# Patient Record
Sex: FEMALE | Race: WHITE | URBAN - METROPOLITAN AREA
[De-identification: names, ages, dates, MRNs, and addresses within clinical notes are randomized per-mention and may not be internally consistent; named-entity substitution may affect disease eponyms.]

---

## 2020-08-25 ENCOUNTER — TELEPHONE (OUTPATIENT)
Dept: SURGERY | Age: 45
End: 2020-08-25

## 2020-08-25 NOTE — TELEPHONE ENCOUNTER
Tell her GBUS showed sludge. Recommendation with this ultrasound and her symptoms is lap clare. If she wants to proceed, put [facility] scheduling sheet in folder for me to fill out. If she wants to discuss further, then follow up at [facility] clinic.

## 2020-09-10 LAB
ADENOVIRUS: NOT DETECTED
ALBUMIN SERPL-MCNC: 4 G/DL (ref 3.6–5)
ALP BLD-CCNC: 81 U/L (ref 46–116)
ALT SERPL-CCNC: 26 U/L (ref 12–78)
AST SERPL-CCNC: 24 U/L (ref 12–36)
BILIRUB SERPL-MCNC: 0.4 MG/DL (ref 0–1.1)
BILIRUBIN DIRECT: 0.1 MG/DL (ref 0–0.3)
BILIRUBIN, INDIRECT: 0.3 MG/DL (ref 0–0.9)
BORDETELLA PARAPERTUSSIS: NOT DETECTED
BORDETELLA PERTUSSIS: NOT DETECTED
CHLAMYDOPHILA PNEUMONIA PCR: NOT DETECTED
CORONAVIRUS 229E: NOT DETECTED
CORONAVIRUS HKU1: NOT DETECTED
CORONAVIRUS NL63: NOT DETECTED
CORONAVIRUS OC43: NOT DETECTED
HUMAN METAPNEUMOVIRUS: NOT DETECTED
HUMAN RHINOVIRUS/ENTEROVIRUS: DETECTED
INFLUENZA A (NO SUBTYPE) BY PCR: NOT DETECTED
INFLUENZA A H1-2009: NOT DETECTED
INFLUENZA A H3: NOT DETECTED
INFLUENZA A: NOT DETECTED
INFLUENZA B: NOT DETECTED
MYCOPLASMA PNEUMONIAE: NOT DETECTED
PARAINFLUENZA 2: NOT DETECTED
PARAINFLUENZA 3: NOT DETECTED
PARAINFLUENZA 4: NOT DETECTED
PARAINFLUENZA1: NOT DETECTED
RESPIRATORY SYNCYTIAL VIRUS: NOT DETECTED
SARS-COV-2: NOT DETECTED
TOTAL PROTEIN: 8.2 G/DL (ref 6.3–8)